# Patient Record
(demographics unavailable — no encounter records)

---

## 2024-11-01 NOTE — DISCUSSION/SUMMARY
[EKG obtained to assist in diagnosis and management of assessed problem(s)] : EKG obtained to assist in diagnosis and management of assessed problem(s) [TextEntry] : Atypical chest discomfort-the patient's chest discomfort appears to be musculoskeletal in nature and may possibly be related to metastatic disease and or simple musculoskeletal discomfort.  The symptoms are mild and have been stable since 2021 so I do not think that is a cause for concern. Shortness of breath-although the patient describes shortness of breath, there is nothing on the current exam to suggest any real pulmonary or cardiac issues.  In addition, prior to 2 days ago, she had no symptoms.  Recent echocardiogram demonstrated fairly normal left ventricular ejection fraction. Metastatic breast cancer-there is nothing on the current evaluation that would suggest a contraindication to the proposed experimental chemotherapy for the patient's metastatic disease.  I would be happy to reassess the patient should there be any other issues.  No follow-up visits given at this time.   Total time of the encounter: 45 minutes which included but was not limited to the following: Face-to-face and non face-to-face time personally spent by the physician preparing to see the patient, obtaining and/or resuming separately obtained history, performing a medically appropriate examination and/or evaluation, counseling and educating the patient/family/caregiver, ordering medications, tests or procedures, referring and communicating with other healthcare professionals, documenting clinical information in the electronic health record, independently interpreting results and communicated results to the patient/family/caregiver and care coordination.

## 2024-11-01 NOTE — PHYSICAL EXAM
[TextEntry] : General/Constitutional: WD/ WN in NAD H: NC/AT Eyes:  PERRL, sclerae and conjunctivae normal without jaundice or xanthelasma; ENMT:normal teeth, gums and palate with no petechiae, pallor or cyanosis Neck: w/o JVD, thyromegaly or adenopathy; normal venous contour Respiratory: clear to auscultation, normal respiratory effort with no retractions or use of accessory respiratory muscles; I was able to reproduce the patient's discomfort by pressing on the right side of the costochondral cartilage Heart: [id number], regular rate, normal S1, S2 without murmurs, rubs, gallops, heaves or thrills Vascular exam: normal carotid upstrokes without carotid or abdominal bruits. 2+/2+ pulses to posterior tibialis and dorsalis pedis Abdomen: soft, nontender, bowels sounds normal without hepatomegaly or splenomegaly, masses or bruits Musculoskeletal:without significant kyphosis or scoliosis Extremities: w/o CCE, good capillary filling Skin: no stasis changes; no ulcers  Neuro: AA and O x 3; no focal neurologic deficits Psych: normal mood and affect

## 2024-11-01 NOTE — REASON FOR VISIT
[TextEntry] : The patient is a 44-year-old woman with a history of metastatic breast cancer who is referred here for Cardiologic assessment prior to undergoing experimental therapy for the cancer.  The patient was apparently in her usual state of health until 2021 when she was diagnosed with right breast cancer.  She has been undergoing chemotherapy since that time but the disease continues to progress.  She has documented metastases to the liver and bone.  The patient is about to begin a trial of experimental chemotherapy and a Cardiologic assessment was requested prior to that.  The patient does describe vague substernal discomfort since 2021 which occurs when she takes deep breaths and when she exerts herself.  She has no real exertional imitations beyond that.  The patient has no prior history of cigarette smoking, diabetes or hypertension.  She has no family history of premature atherosclerotic heart disease.  She does describe some recent difficulties with shortness of breath with exertion over the last few days but she was able to walk the quarter of a mile from our parking lot to our office with no difficulties at all.